# Patient Record
Sex: FEMALE | Race: WHITE | NOT HISPANIC OR LATINO | URBAN - NONMETROPOLITAN AREA
[De-identification: names, ages, dates, MRNs, and addresses within clinical notes are randomized per-mention and may not be internally consistent; named-entity substitution may affect disease eponyms.]

---

## 2021-07-30 ENCOUNTER — OFFICE VISIT (OUTPATIENT)
Dept: URBAN - NONMETROPOLITAN AREA CLINIC 2 | Facility: CLINIC | Age: 31
End: 2021-07-30
Payer: MEDICAID

## 2021-07-30 ENCOUNTER — WEB ENCOUNTER (OUTPATIENT)
Dept: URBAN - NONMETROPOLITAN AREA CLINIC 2 | Facility: CLINIC | Age: 31
End: 2021-07-30

## 2021-07-30 ENCOUNTER — OFFICE VISIT (OUTPATIENT)
Dept: URBAN - NONMETROPOLITAN AREA CLINIC 2 | Facility: CLINIC | Age: 31
End: 2021-07-30

## 2021-07-30 ENCOUNTER — DASHBOARD ENCOUNTERS (OUTPATIENT)
Age: 31
End: 2021-07-30

## 2021-07-30 DIAGNOSIS — R10.30 LOWER ABDOMINAL PAIN: ICD-10-CM

## 2021-07-30 DIAGNOSIS — K59.04 CHRONIC IDIOPATHIC CONSTIPATION: ICD-10-CM

## 2021-07-30 DIAGNOSIS — R14.0 BLOATING: ICD-10-CM

## 2021-07-30 PROCEDURE — 99204 OFFICE O/P NEW MOD 45 MIN: CPT | Performed by: NURSE PRACTITIONER

## 2021-07-30 RX ORDER — OMEPRAZOLE 40 MG/1
1 CAPSULE 30 MINUTES BEFORE MORNING MEAL CAPSULE, DELAYED RELEASE ORAL ONCE A DAY
Status: ACTIVE | COMMUNITY

## 2021-07-30 RX ORDER — BUDESONIDE AND FORMOTEROL FUMARATE DIHYDRATE 160; 4.5 UG/1; UG/1
2 PUFFS AEROSOL RESPIRATORY (INHALATION) TWICE A DAY
Status: ACTIVE | COMMUNITY

## 2021-07-30 RX ORDER — METOPROLOL TARTRATE 25 MG/1
1 TABLET WITH FOOD TABLET, FILM COATED ORAL TWICE A DAY
Status: ACTIVE | COMMUNITY

## 2021-07-30 RX ORDER — BUPROPION HYDROCHLORIDE 100 MG/1
1 TABLET TABLET, FILM COATED ORAL TWICE A DAY
Status: ACTIVE | COMMUNITY

## 2021-07-30 RX ORDER — GABAPENTIN 300 MG/1
1 CAPSULE CAPSULE ORAL ONCE A DAY
Status: ACTIVE | COMMUNITY

## 2021-07-30 RX ORDER — ZIPRASIDONE HYDROCHLORIDE 20 MG/1
1 CAPSULE WITH FOOD CAPSULE ORAL TWICE A DAY
Status: ACTIVE | COMMUNITY

## 2021-07-30 RX ORDER — TRAZODONE HYDROCHLORIDE 100 MG/1
1 TABLET AT BEDTIME TABLET, FILM COATED ORAL ONCE A DAY
Status: ACTIVE | COMMUNITY

## 2021-07-30 RX ORDER — POLYETHYLENE GLYCOL 3350, SODIUM SULFATE ANHYDROUS, SODIUM BICARBONATE, SODIUM CHLORIDE, POTASSIUM CHLORIDE 236; 22.74; 6.74; 5.86; 2.97 G/4L; G/4L; G/4L; G/4L; G/4L
AS DIRECTED POWDER, FOR SOLUTION ORAL ONCE
Qty: 1 GALLON | Refills: 0 | OUTPATIENT
Start: 2021-07-30 | End: 2021-07-31

## 2021-07-30 RX ORDER — MONTELUKAST 10 MG/1
1 TABLET TABLET, FILM COATED ORAL ONCE A DAY
Status: ACTIVE | COMMUNITY

## 2021-07-30 RX ORDER — LACTULOSE 10 G/15ML
15 ML SOLUTION ORAL
Qty: 1350 MILLILITER | Refills: 6 | OUTPATIENT
Start: 2021-07-30 | End: 2022-02-24

## 2021-07-30 RX ORDER — ONDANSETRON HYDROCHLORIDE 8 MG/1
1 TABLET AS NEEDED TABLET, FILM COATED ORAL ONCE A DAY
Status: ACTIVE | COMMUNITY

## 2021-07-30 NOTE — HPI-TODAY'S VISIT:
Ms. Kramer is a 30-year-old female who presents with abdominal pain and constipation.  She states she has a longstanding history of constipation.  She did see a physician in [place] as a child for this.  She denies having any kind of colonoscopy before.  She states she only moves her bowels about once a month.  She has significant bloating.  She has been seen in the ER for this complaint and had a CT scan at [facility] in [place] in February.  She has tried most over-the-counter's and failed as well is Linzess 145.  Linzess caused significant cramping for about 3 months, but no BMs.  She was treated previously for a rectal fissure.  She states she has a lot of pain and bleeding with BM.  She occasionally has to disimpact herself.  She states that she had some chronic UTI issues and they attempted to do a cystoscopy, but the urologist was actually not able to get through her urethra stating that it seemed like her bowels were pressing in on it per  patient.  This was done at [place] recently.  No additional complaints.  She reports a lot of her family members have IBS, but they have IBS-D she has had her gallbladder taken out a few years ago.  Past medical history is also pertinent for 3 C-sections.. sb

## 2021-08-01 ENCOUNTER — TELEPHONE ENCOUNTER (OUTPATIENT)
Dept: URBAN - METROPOLITAN AREA CLINIC 92 | Facility: CLINIC | Age: 31
End: 2021-08-01

## 2021-08-01 LAB
A/G RATIO: 1.4
ALBUMIN: 4.3
ALKALINE PHOSPHATASE: 89
AST (SGOT): 19
BASO (ABSOLUTE): 0
BASOS: 0
BILIRUBIN, TOTAL: <0.2
BUN/CREATININE RATIO: 6
BUN: 5
CALCIUM: 9
CARBON DIOXIDE, TOTAL: 22
CHLORIDE: 100
CREATININE: 0.86
DEAMIDATED GLIADIN ABS, IGA: 4
DEAMIDATED GLIADIN ABS, IGG: 3
EGFR IF AFRICN AM: 105
EGFR IF NONAFRICN AM: 91
ENDOMYSIAL ANTIBODY IGA: NEGATIVE
EOS (ABSOLUTE): 0.3
EOS: 3
GLOBULIN, TOTAL: 3
GLUCOSE: 72
HEMATOCRIT: 35
HEMATOLOGY COMMENTS:: (no result)
HEMOGLOBIN: 10.8
IMMATURE CELLS: (no result)
IMMATURE GRANS (ABS): 0
IMMATURE GRANULOCYTES: 0
IMMUNOGLOBULIN A, QN, SERUM: 162
LYMPHS (ABSOLUTE): 2.1
LYMPHS: 23
MCH: 26.3
MCHC: 30.9
MCV: 85
MONOCYTES(ABSOLUTE): 0.7
MONOCYTES: 7
NEUTROPHILS (ABSOLUTE): 5.9
NEUTROPHILS: 67
NRBC: (no result)
PLATELETS: 376
POTASSIUM: 4
PROTEIN, TOTAL: 7.3
RBC: 4.11
RDW: 12.9
SODIUM: 136
T-TRANSGLUTAMINASE (TTG) IGA: <2
T-TRANSGLUTAMINASE (TTG) IGG: <2
T4,FREE(DIRECT): 1.07
TSH: 4.5
WBC: 8.9

## 2021-08-31 ENCOUNTER — TELEPHONE ENCOUNTER (OUTPATIENT)
Dept: URBAN - NONMETROPOLITAN AREA CLINIC 2 | Facility: CLINIC | Age: 31
End: 2021-08-31

## 2021-09-10 ENCOUNTER — OFFICE VISIT (OUTPATIENT)
Dept: URBAN - NONMETROPOLITAN AREA SURGERY CENTER 1 | Facility: SURGERY CENTER | Age: 31
End: 2021-09-10

## 2021-10-15 ENCOUNTER — OFFICE VISIT (OUTPATIENT)
Dept: URBAN - NONMETROPOLITAN AREA CLINIC 2 | Facility: CLINIC | Age: 31
End: 2021-10-15

## 2021-11-12 ENCOUNTER — OFFICE VISIT (OUTPATIENT)
Dept: URBAN - NONMETROPOLITAN AREA SURGERY CENTER 1 | Facility: SURGERY CENTER | Age: 31
End: 2021-11-12

## 2021-12-10 ENCOUNTER — OFFICE VISIT (OUTPATIENT)
Dept: URBAN - NONMETROPOLITAN AREA CLINIC 2 | Facility: CLINIC | Age: 31
End: 2021-12-10

## 2022-01-15 PROBLEM — 82934008: Status: ACTIVE | Noted: 2021-07-30

## 2022-01-21 ENCOUNTER — OFFICE VISIT (OUTPATIENT)
Dept: URBAN - NONMETROPOLITAN AREA SURGERY CENTER 1 | Facility: SURGERY CENTER | Age: 32
End: 2022-01-21
Payer: MEDICAID

## 2022-01-21 DIAGNOSIS — K59.09 CHANGE IN BOWEL MOVEMENTS INTERMITTENT CONSTIPATION. URGENCY IN THE MORNING.: ICD-10-CM

## 2022-01-21 PROCEDURE — 45378 DIAGNOSTIC COLONOSCOPY: CPT | Performed by: INTERNAL MEDICINE

## 2022-01-21 PROCEDURE — G8907 PT DOC NO EVENTS ON DISCHARG: HCPCS | Performed by: INTERNAL MEDICINE

## 2022-02-03 ENCOUNTER — TELEPHONE ENCOUNTER (OUTPATIENT)
Dept: URBAN - NONMETROPOLITAN AREA CLINIC 2 | Facility: CLINIC | Age: 32
End: 2022-02-03

## 2022-02-03 RX ORDER — PRUCALOPRIDE 2 MG/1
1 TABLET TABLET, FILM COATED ORAL ONCE A DAY
Qty: 90 TABLET | Refills: 3 | OUTPATIENT
Start: 2022-02-04 | End: 2023-01-30

## 2022-02-04 PROBLEM — 35298007: Status: ACTIVE | Noted: 2022-02-04
